# Patient Record
Sex: FEMALE | Race: WHITE | NOT HISPANIC OR LATINO | URBAN - METROPOLITAN AREA
[De-identification: names, ages, dates, MRNs, and addresses within clinical notes are randomized per-mention and may not be internally consistent; named-entity substitution may affect disease eponyms.]

---

## 2020-11-16 ENCOUNTER — OFFICE VISIT (OUTPATIENT)
Dept: URBAN - METROPOLITAN AREA CLINIC 19 | Facility: CLINIC | Age: 40
End: 2020-11-16

## 2021-05-06 ENCOUNTER — OFFICE VISIT (OUTPATIENT)
Dept: URBAN - METROPOLITAN AREA CLINIC 19 | Facility: CLINIC | Age: 41
End: 2021-05-06
Payer: COMMERCIAL

## 2021-05-06 ENCOUNTER — WEB ENCOUNTER (OUTPATIENT)
Dept: URBAN - METROPOLITAN AREA CLINIC 19 | Facility: CLINIC | Age: 41
End: 2021-05-06

## 2021-05-06 DIAGNOSIS — K58.0 IRRITABLE BOWEL SYNDROME WITH DIARRHEA: ICD-10-CM

## 2021-05-06 DIAGNOSIS — R12 HEARTBURN: ICD-10-CM

## 2021-05-06 DIAGNOSIS — R10.13 EPIGASTRIC ABDOMINAL PAIN: ICD-10-CM

## 2021-05-06 PROBLEM — 197125005: Status: ACTIVE | Noted: 2021-05-06

## 2021-05-06 PROCEDURE — 99204 OFFICE O/P NEW MOD 45 MIN: CPT | Performed by: INTERNAL MEDICINE

## 2021-05-06 RX ORDER — DROSPIRENONE AND ETHINYL ESTRADIOL 0.02-3(28)
TAKE 1 TABLET BY ORAL ROUTE ONCE DAILY KIT ORAL 1
Qty: 0 | Refills: 0 | Status: ACTIVE | COMMUNITY
Start: 1900-01-01

## 2021-05-06 RX ORDER — LOSARTAN POTASSIUM 50 MG/1
1 TABLET TABLET ORAL ONCE A DAY
Status: ACTIVE | COMMUNITY

## 2021-05-06 RX ORDER — LATANOPROST 50 UG/ML
1 DROP INTO AFFECTED EYE IN THE EVENING SOLUTION/ DROPS OPHTHALMIC ONCE A DAY
Status: ACTIVE | COMMUNITY

## 2021-05-06 RX ORDER — PANTOPRAZOLE SODIUM 40 MG/1
1 TABLET TABLET, DELAYED RELEASE ORAL BID
Qty: 180 TABLET | Refills: 2 | OUTPATIENT
Start: 2021-05-06

## 2021-05-06 NOTE — HPI-TODAY'S VISIT:
Mrs. Krishnamurthy is a 40 year old female who presents to GI clinic for heartburn and IBS.   She reports being diagnosed with IBS around 2008. She reports having diarrhea and reflux predominately in the morning. She reports also having lower abdominal cramping until she has a bowel movements. She reports having 2-5 BMs in the mornings and occassionally 1 BM in the evening. She reports her stools typically vary between a 5-6 on the bristol stool scale.   She reports her nexium 20mg QPM and pepcic OTC in the morning. She reports she been on this for approximately 1 year and reports continued breakthru symptoms.   On 1/26/2017 Dr. Jorgensen performed an EGD which showed a 2 cm hiatal hernia and bilius fluid in the stomach. After the EGD she was prescribed sucrafate BID which she reports has not helped much with symptoms.   Pathology of GE jucntion showed reflux esophagitis with surface erosion and duodenal biopsies were unremarkable.   RUQ US on 2/10/2017 showed cholelithiasis and enlargement of liver measuring up to 22 cm in maximum dimension is favored to be due to a normal variant Riedel's lobe as it is otherwise normal in appearance.
FLAT

## 2021-05-08 LAB
H PYLORI, IGM ABS: <9
H. PYLORI, IGA ABS: 10.5
H. PYLORI, IGG ABS: 0.19

## 2021-05-10 ENCOUNTER — TELEPHONE ENCOUNTER (OUTPATIENT)
Dept: URBAN - METROPOLITAN AREA CLINIC 92 | Facility: CLINIC | Age: 41
End: 2021-05-10

## 2021-07-07 ENCOUNTER — OFFICE VISIT (OUTPATIENT)
Dept: URBAN - METROPOLITAN AREA CLINIC 19 | Facility: CLINIC | Age: 41
End: 2021-07-07
Payer: COMMERCIAL

## 2021-07-07 DIAGNOSIS — A04.8 H. PYLORI INFECTION: ICD-10-CM

## 2021-07-07 PROCEDURE — 99214 OFFICE O/P EST MOD 30 MIN: CPT | Performed by: INTERNAL MEDICINE

## 2021-07-07 RX ORDER — LOSARTAN POTASSIUM 50 MG/1
1 TABLET TABLET ORAL ONCE A DAY
Status: ACTIVE | COMMUNITY

## 2021-07-07 RX ORDER — DOXYCYCLINE HYCLATE 100 MG/1
1 TABLET TABLET, COATED ORAL
Qty: 28 TABLET | Refills: 0 | OUTPATIENT
Start: 2021-07-07 | End: 2021-07-21

## 2021-07-07 RX ORDER — LATANOPROST 50 UG/ML
1 DROP INTO AFFECTED EYE IN THE EVENING SOLUTION/ DROPS OPHTHALMIC ONCE A DAY
Status: ACTIVE | COMMUNITY

## 2021-07-07 RX ORDER — METRONIDAZOLE 250 MG/1
1 TABLET TABLET ORAL QID
Qty: 56 TABLET | Refills: 0 | OUTPATIENT
Start: 2021-07-07 | End: 2021-07-21

## 2021-07-07 RX ORDER — DROSPIRENONE AND ETHINYL ESTRADIOL 0.02-3(28)
TAKE 1 TABLET BY ORAL ROUTE ONCE DAILY KIT ORAL 1
Qty: 0 | Refills: 0 | Status: ACTIVE | COMMUNITY
Start: 1900-01-01

## 2021-07-07 RX ORDER — BISMUTH SUBSALICYLATE 262 MG/1
2 TABLETS TABLET, CHEWABLE ORAL
Qty: 112 TABLET | Refills: 0 | OUTPATIENT
Start: 2021-07-07 | End: 2021-07-21

## 2021-07-07 RX ORDER — PANTOPRAZOLE SODIUM 40 MG/1
1 TABLET TABLET, DELAYED RELEASE ORAL BID
Qty: 180 TABLET | Refills: 2 | Status: ACTIVE | COMMUNITY
Start: 2021-05-06

## 2021-07-07 NOTE — HPI-TODAY'S VISIT:
Mrs. Krishnamurthy is a 40 year old female who was last seen in GI clinic on 5/6/2021 for heartburn and IBS.     On 5/6/2021 she had H. pylori serology testing performed and IgA was 10.5 (equivocal is 9.0-11.0).   She was asked to do a breath test to confirm presence of H. pylori but was unable to get off protonix due to symptoms.   She reports protonix 40mg BID which was started on 5/6/2021 is doing very well for her symptoms.   Prior history is summarized below: -On 1/26/2017 Dr. Jorgensen performed an EGD which showed a 2 cm hiatal hernia and bilius fluid in the stomach. After the EGD she was prescribed sucrafate BID which she reports has not helped much with symptoms. Pathology of GE junction showed reflux esophagitis with surface erosion and duodenal biopsies were unremarkable.  -RUQ US on 2/10/2017 showed cholelithiasis and enlargement of liver measuring up to 22 cm in maximum dimension is favored to be due to a normal variant Riedel's lobe as it is otherwise normal in appearance. -She reports being diagnosed with IBS around 2008. She reports having diarrhea and reflux predominately in the morning. She reports also having lower abdominal cramping until she has a bowel movements. She reports having 2-5 BMs in the mornings and occassionally 1 BM in the evening. She reports her stools typically vary between a 5-6 on the bristol stool scale.  -On 5/6/2021 she reported taking nexium 20mg QPM and pepcic OTC in the morning. She reports she been on this for approximately 1 year and reports continued breakthru symptoms.

## 2021-08-19 ENCOUNTER — OFFICE VISIT (OUTPATIENT)
Dept: URBAN - METROPOLITAN AREA CLINIC 18 | Facility: CLINIC | Age: 41
End: 2021-08-19
Payer: COMMERCIAL

## 2021-08-19 DIAGNOSIS — A04.8 BACTERIAL INFECTION DUE TO H. PYLORI: ICD-10-CM

## 2021-08-19 PROCEDURE — 83014 H PYLORI DRUG ADMIN: CPT | Performed by: INTERNAL MEDICINE

## 2021-08-27 ENCOUNTER — TELEPHONE ENCOUNTER (OUTPATIENT)
Dept: URBAN - METROPOLITAN AREA CLINIC 92 | Facility: CLINIC | Age: 41
End: 2021-08-27

## 2021-09-01 ENCOUNTER — OFFICE VISIT (OUTPATIENT)
Dept: URBAN - METROPOLITAN AREA CLINIC 19 | Facility: CLINIC | Age: 41
End: 2021-09-01
Payer: COMMERCIAL

## 2021-09-01 ENCOUNTER — DASHBOARD ENCOUNTERS (OUTPATIENT)
Age: 41
End: 2021-09-01

## 2021-09-01 DIAGNOSIS — R12 HEARTBURN: ICD-10-CM

## 2021-09-01 PROCEDURE — 99213 OFFICE O/P EST LOW 20 MIN: CPT | Performed by: INTERNAL MEDICINE

## 2021-09-01 RX ORDER — DROSPIRENONE AND ETHINYL ESTRADIOL 0.02-3(28)
TAKE 1 TABLET BY ORAL ROUTE ONCE DAILY KIT ORAL 1
Qty: 0 | Refills: 0 | Status: ACTIVE | COMMUNITY
Start: 1900-01-01

## 2021-09-01 RX ORDER — LOSARTAN POTASSIUM 50 MG/1
1 TABLET TABLET ORAL ONCE A DAY
Status: ACTIVE | COMMUNITY

## 2021-09-01 RX ORDER — PANTOPRAZOLE SODIUM 40 MG/1
1 TABLET TABLET, DELAYED RELEASE ORAL BID
Qty: 180 TABLET | Refills: 2 | Status: ACTIVE | COMMUNITY
Start: 2021-05-06

## 2021-09-01 RX ORDER — PANTOPRAZOLE SODIUM 40 MG/1
1 TABLET TABLET, DELAYED RELEASE ORAL ONCE A DAY
Qty: 90 TABLET | Refills: 3 | OUTPATIENT
Start: 2021-09-01

## 2021-09-01 RX ORDER — LATANOPROST 50 UG/ML
1 DROP INTO AFFECTED EYE IN THE EVENING SOLUTION/ DROPS OPHTHALMIC ONCE A DAY
Status: ACTIVE | COMMUNITY

## 2021-09-01 NOTE — HPI-TODAY'S VISIT:
Mrs. Krishnamurthy is a 40 year old female who was last seen in GI clinic on 7/7/2021 for H. pylori infection        On 7/7/2021 H. pylori was treated with flagyl, bismuth, doxycycline, and protonix.   She had H. pylori breath test 8/24/2021 which was negative.   She reports once getting off her PPI her symptoms returned and she resumed her protonix.   Prior history is summarized below: -On 1/26/2017 Dr. Jorgensen performed an EGD which showed a 2 cm hiatal hernia and bilius fluid in the stomach. After the EGD she was prescribed sucrafate BID which she reports has not helped much with symptoms. Pathology of GE junction showed reflux esophagitis with surface erosion and duodenal biopsies were unremarkable.  -RUQ US on 2/10/2017 showed cholelithiasis and enlargement of liver measuring up to 22 cm in maximum dimension is favored to be due to a normal variant Riedel's lobe as it is otherwise normal in appearance. -She reports being diagnosed with IBS around 2008. She reports having diarrhea and reflux predominately in the morning. She reports also having lower abdominal cramping until she has a bowel movements. She reports having 2-5 BMs in the mornings and occassionally 1 BM in the evening. She reports her stools typically vary between a 5-6 on the bristol stool scale.  -On 5/6/2021 she reported taking nexium 20mg QPM and pepcic OTC in the morning. She reports she been on this for approximately 1 year and reports continued breakthru symptoms. -On 5/6/2021 she had H. pylori serology testing performed and IgA was 10.5 (equivocal is 9.0-11.0).

## 2021-12-01 ENCOUNTER — OFFICE VISIT (OUTPATIENT)
Dept: URBAN - METROPOLITAN AREA CLINIC 19 | Facility: CLINIC | Age: 41
End: 2021-12-01